# Patient Record
Sex: MALE | URBAN - METROPOLITAN AREA
[De-identification: names, ages, dates, MRNs, and addresses within clinical notes are randomized per-mention and may not be internally consistent; named-entity substitution may affect disease eponyms.]

---

## 2022-04-02 ENCOUNTER — APPOINTMENT (RX ONLY)
Dept: URBAN - METROPOLITAN AREA CLINIC 92 | Facility: CLINIC | Age: 22
Setting detail: DERMATOLOGY
End: 2022-04-02

## 2022-04-02 DIAGNOSIS — L42 PITYRIASIS ROSEA: ICD-10-CM | Status: RESOLVED

## 2022-04-02 PROCEDURE — ? COUNSELING

## 2022-04-02 PROCEDURE — 99203 OFFICE O/P NEW LOW 30 MIN: CPT

## 2022-04-02 PROCEDURE — ? LAB REPORTS REVIEWED

## 2022-04-02 PROCEDURE — ? TREATMENT REGIMEN

## 2022-04-02 ASSESSMENT — LOCATION SIMPLE DESCRIPTION DERM: LOCATION SIMPLE: RIGHT LOWER BACK

## 2022-04-02 ASSESSMENT — SEVERITY ASSESSMENT: SEVERITY: ALMOST CLEAR

## 2022-04-02 ASSESSMENT — LOCATION DETAILED DESCRIPTION DERM: LOCATION DETAILED: RIGHT SUPERIOR MEDIAL MIDBACK

## 2022-04-02 ASSESSMENT — LOCATION ZONE DERM: LOCATION ZONE: TRUNK

## 2022-04-02 NOTE — PROCEDURE: LAB REPORTS REVIEWED
Labs Reviewed Override: HSV-1 and HSV-2
Summarized Lab Results: Labs within normal ranges. Explain the test results shows patient has had a previous exposed to virus.
Detail Level: Zone